# Patient Record
Sex: FEMALE | Race: OTHER | HISPANIC OR LATINO | URBAN - METROPOLITAN AREA
[De-identification: names, ages, dates, MRNs, and addresses within clinical notes are randomized per-mention and may not be internally consistent; named-entity substitution may affect disease eponyms.]

---

## 2024-04-16 ENCOUNTER — EMERGENCY (EMERGENCY)
Facility: HOSPITAL | Age: 44
LOS: 1 days | Discharge: HOME | End: 2024-04-16
Attending: STUDENT IN AN ORGANIZED HEALTH CARE EDUCATION/TRAINING PROGRAM
Payer: MEDICAID

## 2024-04-16 VITALS
TEMPERATURE: 98 F | DIASTOLIC BLOOD PRESSURE: 87 MMHG | SYSTOLIC BLOOD PRESSURE: 145 MMHG | RESPIRATION RATE: 20 BRPM | HEART RATE: 78 BPM | OXYGEN SATURATION: 96 %

## 2024-04-16 VITALS
HEART RATE: 86 BPM | RESPIRATION RATE: 16 BRPM | TEMPERATURE: 98 F | WEIGHT: 173.5 LBS | SYSTOLIC BLOOD PRESSURE: 127 MMHG | DIASTOLIC BLOOD PRESSURE: 85 MMHG | OXYGEN SATURATION: 94 %

## 2024-04-16 LAB
BASOPHILS # BLD AUTO: 0.05 K/UL — SIGNIFICANT CHANGE UP (ref 0–0.2)
BASOPHILS NFR BLD AUTO: 0.6 % — SIGNIFICANT CHANGE UP (ref 0–2)
EOSINOPHIL # BLD AUTO: 0.08 K/UL — SIGNIFICANT CHANGE UP (ref 0–0.5)
EOSINOPHIL NFR BLD AUTO: 0.9 % — SIGNIFICANT CHANGE UP (ref 0–6)
HCT VFR BLD CALC: 36.5 % — SIGNIFICANT CHANGE UP (ref 34.5–45)
HGB BLD-MCNC: 11.8 G/DL — SIGNIFICANT CHANGE UP (ref 11.5–15.5)
IMM GRANULOCYTES NFR BLD AUTO: 0.2 % — SIGNIFICANT CHANGE UP (ref 0–0.9)
LYMPHOCYTES # BLD AUTO: 3.34 K/UL — HIGH (ref 1–3.3)
LYMPHOCYTES # BLD AUTO: 37.4 % — SIGNIFICANT CHANGE UP (ref 13–44)
MCHC RBC-ENTMCNC: 25.8 PG — LOW (ref 27–34)
MCHC RBC-ENTMCNC: 32.3 GM/DL — SIGNIFICANT CHANGE UP (ref 32–36)
MCV RBC AUTO: 79.7 FL — LOW (ref 80–100)
MONOCYTES # BLD AUTO: 0.95 K/UL — HIGH (ref 0–0.9)
MONOCYTES NFR BLD AUTO: 10.7 % — SIGNIFICANT CHANGE UP (ref 2–14)
NEUTROPHILS # BLD AUTO: 4.48 K/UL — SIGNIFICANT CHANGE UP (ref 1.8–7.4)
NEUTROPHILS NFR BLD AUTO: 50.2 % — SIGNIFICANT CHANGE UP (ref 43–77)
PLATELET # BLD AUTO: 396 K/UL — SIGNIFICANT CHANGE UP (ref 150–400)
RBC # BLD: 4.58 M/UL — SIGNIFICANT CHANGE UP (ref 3.8–5.2)
RBC # FLD: 14.4 % — SIGNIFICANT CHANGE UP (ref 10.3–14.5)
WBC # BLD: 8.92 K/UL — SIGNIFICANT CHANGE UP (ref 3.8–10.5)
WBC # FLD AUTO: 8.92 K/UL — SIGNIFICANT CHANGE UP (ref 3.8–10.5)

## 2024-04-16 PROCEDURE — 99284 EMERGENCY DEPT VISIT MOD MDM: CPT

## 2024-04-16 PROCEDURE — 93010 ELECTROCARDIOGRAM REPORT: CPT

## 2024-04-16 RX ORDER — MECLIZINE HCL 12.5 MG
50 TABLET ORAL ONCE
Refills: 0 | Status: COMPLETED | OUTPATIENT
Start: 2024-04-16 | End: 2024-04-16

## 2024-04-16 RX ORDER — SODIUM CHLORIDE 9 MG/ML
1000 INJECTION INTRAMUSCULAR; INTRAVENOUS; SUBCUTANEOUS ONCE
Refills: 0 | Status: COMPLETED | OUTPATIENT
Start: 2024-04-16 | End: 2024-04-16

## 2024-04-16 RX ORDER — ONDANSETRON 8 MG/1
4 TABLET, FILM COATED ORAL ONCE
Refills: 0 | Status: COMPLETED | OUTPATIENT
Start: 2024-04-16 | End: 2024-04-16

## 2024-04-16 RX ADMIN — ONDANSETRON 4 MILLIGRAM(S): 8 TABLET, FILM COATED ORAL at 23:42

## 2024-04-16 RX ADMIN — Medication 50 MILLIGRAM(S): at 23:42

## 2024-04-16 RX ADMIN — SODIUM CHLORIDE 1000 MILLILITER(S): 9 INJECTION INTRAMUSCULAR; INTRAVENOUS; SUBCUTANEOUS at 23:44

## 2024-04-16 NOTE — ED ADULT TRIAGE NOTE - CHIEF COMPLAINT QUOTE
pt c/o dizziness and vomiting onset today, states feeling warm but hasn't taken temp.  States her head is spinning.  Took Tylenol at 3pm

## 2024-04-17 LAB
ANION GAP SERPL CALC-SCNC: 11 MMOL/L — SIGNIFICANT CHANGE UP (ref 5–17)
BUN SERPL-MCNC: 11.7 MG/DL — SIGNIFICANT CHANGE UP (ref 8–20)
CALCIUM SERPL-MCNC: 9.2 MG/DL — SIGNIFICANT CHANGE UP (ref 8.4–10.5)
CHLORIDE SERPL-SCNC: 100 MMOL/L — SIGNIFICANT CHANGE UP (ref 96–108)
CO2 SERPL-SCNC: 26 MMOL/L — SIGNIFICANT CHANGE UP (ref 22–29)
CREAT SERPL-MCNC: 0.58 MG/DL — SIGNIFICANT CHANGE UP (ref 0.5–1.3)
EGFR: 114 ML/MIN/1.73M2 — SIGNIFICANT CHANGE UP
GLUCOSE SERPL-MCNC: 103 MG/DL — HIGH (ref 70–99)
POTASSIUM SERPL-MCNC: 3.7 MMOL/L — SIGNIFICANT CHANGE UP (ref 3.5–5.3)
POTASSIUM SERPL-SCNC: 3.7 MMOL/L — SIGNIFICANT CHANGE UP (ref 3.5–5.3)
SODIUM SERPL-SCNC: 137 MMOL/L — SIGNIFICANT CHANGE UP (ref 135–145)

## 2024-04-17 PROCEDURE — 80048 BASIC METABOLIC PNL TOTAL CA: CPT

## 2024-04-17 PROCEDURE — 85025 COMPLETE CBC W/AUTO DIFF WBC: CPT

## 2024-04-17 PROCEDURE — 96374 THER/PROPH/DIAG INJ IV PUSH: CPT

## 2024-04-17 PROCEDURE — 93005 ELECTROCARDIOGRAM TRACING: CPT

## 2024-04-17 PROCEDURE — T1013: CPT

## 2024-04-17 PROCEDURE — 99284 EMERGENCY DEPT VISIT MOD MDM: CPT | Mod: 25

## 2024-04-17 PROCEDURE — 36415 COLL VENOUS BLD VENIPUNCTURE: CPT

## 2024-04-17 RX ORDER — MECLIZINE HCL 12.5 MG
1 TABLET ORAL
Qty: 30 | Refills: 0
Start: 2024-04-17 | End: 2024-04-26

## 2024-04-17 NOTE — ED PROVIDER NOTE - PATIENT PORTAL LINK FT
You can access the FollowMyHealth Patient Portal offered by [facility] by registering at the following website: [email or web address]. By joining [employer]’s FollowMyHealth portal, you will also be able to view your health information using other applications (apps) compatible with our system.

## 2024-04-17 NOTE — ED PROVIDER NOTE - NSFOLLOWUPINSTRUCTIONS_ED_ALL_ED_FT
Vértigo      El vértigo es la sensación de que usted o todo lo que lo rodea se mueve cuando en realidad eso no sucede. El vértigo puede ser peligroso si ocurre mientras está haciendo algo que podría suponer un riesgo para usted y para los demás, por ejemplo, conduciendo un automóvil.    ¿Cuáles son las causas?  Esta afección es causada por bridger alteración en las señales que [place] sistema sensorial envía al cerebro. Existen diferentes factores que pueden causar esta alteración, por ejemplo:    Infecciones, especialmente en el oído interno.  Bridger reacción adversa a un medicamento o el uso indebido de alcohol y medicamentos.  Abstinencia de drogas o alcohol.  Cambios rápidos de posición, alexander al acostarse o darse vuelta en la cama.  Cefaleas migrañosas.  Bridger disminución del flujo sanguíneo hacia el cerebro.  Bridger disminución de la presión arterial.  Un aumento de la presión en el cerebro por un traumatismo en la nancy o el antonio, accidente cerebrovascular, infección, tumor o sangrado.  Trastornos del sistema nervioso central.    ¿Cuáles son los signos o los síntomas?  Generalmente, los síntomas de iraida trastorno se presentan al  la nancy o los ojos en diferentes direcciones. Los síntomas pueden aparecer repentinamente y suelen durar menos de un minuto. Entre los síntomas se pueden incluir los siguientes:    Pérdida del equilibrio y caídas.  Sensación de estar dando vueltas o moviéndose.  Sensación de que el entorno está dando vueltas o moviéndose.  Náuseas y vómitos.  Visión doble o borrosa.  Dificultad para escuchar.  Hablar arrastrando las palabras.  Mareos.  Movimientos oculares involuntarios (nistagmo).    Los síntomas pueden ser leves y un poco molestos, o pueden ser graves e interferir con la paty cotidiana. Los episodios de vértigo pueden repetirse (ser recurrentes) a lo peyton del tiempo y algunos movimientos pueden desencadenarlos. Los síntomas pueden mejorar con el tiempo.    ¿Cómo se diagnostica?  Esta afección puede diagnosticarse en función de la historia clínica y la calidad del nistagmo. [place] médico podrá examinar el movimiento de ira ojos indicándole que los cambie de dirección rápidamente para provocar el nistagmo. [place] se llama también prueba de [place], prueba de impulso cefálico o prueba de rotación. Adeel vez lo deriven a un médico especialista en oído, nariz y garganta (otorrinolaringólogo) o a kelly que se especializa en trastornos del sistema nervioso central (neurólogo).    Pueden hacerle otros estudios, entre ellos:    Un examen físico.  Análisis de raul.  Resonancia magnética (RM).  Bridger exploración por tomografía computarizada (TC).  Un electrocardiograma (ECG). Iraida estudio registra la actividad eléctrica del corazón.  Un electroencefalograma (EEG). Iraida estudio registra la actividad eléctrica del cerebro.  Pruebas de audición.    ¿Cómo se trata?  El tratamiento de esta afección depende de la causa y la gravedad de los síntomas. Las opciones de tratamiento incluyen:    Medicamentos para tratar las náuseas o el vértigo. Se utilizan generalmente para los casos graves. Algunos medicamentos que se utilizan para tratar otras afecciones también podrían reducir o eliminar los síntomas del vértigo. Estos incluyen los siguientes:  Medicamentos para controlar las alergias (antihistamínicos).  Medicamentos para controlar las convulsiones (anticonvulsivos).  Medicamentos para aliviar la depresión (antidepresivos).  Medicamentos para aliviar la ansiedad (sedantes).  Movimientos de nancy para acomodar el oído interno otra vez a la normalidad. Si el vértigo es causado por un problema en el oído, [place] médico podría recomendarle que nora ciertos movimientos para corregir el problema.  Cirugía. [place] es raro.    Siga estas indicaciones en [place] casa:  Seguridad    Muévase despacio.No nora movimientos bruscos con el cuerpo o con la nancy.  No conduzca.  No opere maquinaria pesada.  No nora ninguna tarea que podría ser peligrosa para usted o para otras personas en pan de que ocurriera un episodio de vértigo.  Si tiene dificultad para caminar o mantener el equilibrio, use un bastón para mantener la estabilidad. Si se siente mareado o inestable, siéntese de inmediato.  Retome ira actividades normales alexander se lo haya indicado el médico. Pregúntele al médico qué actividades son seguras para usted.    Instrucciones generales    [place] los medicamentos de venta radha y los recetados solamente alexander se lo haya indicado el médico.  Evite algunas posiciones o determinados movimientos alexander se lo haya indicado el médico.  [place]aina suficiente líquido para mantener la orina fatuma o de color amarillo pálido.  Concurra a todas las visitas de seguimiento alexander se lo haya indicado el médico. [place] es importante.    Comuníquese con un médico si:  Los medicamentos no le alivian el vértigo o iraida empeora.  Tiene fiebre.  [place] afección empeora o presenta síntomas nuevos.  Ira familiares o amigos advierten cambios en [place] comportamiento.  Las náuseas o los vómitos empeoran.  Tiene sensación de adormecimiento o de “hormigueo” en bridger parte del cuerpo.    Solicite ayuda de inmediato si:  Tiene dificultad para hablar o para moverse.  Esta mareado todo el tiempo.  Se desmaya.  Tiene josh de nancy intensos.  Tiene debilidad en las abimbola, los brazos o las piernas.  Presenta cambios en la audición o la visión.  Siente rigidez en el antonio.  Tiene sensibilidad a la del.    NOTAS ADICIONALES E INSTRUCCIONES    [place] meclizine 25mg cada 6 horas para el mareo.  Por favor tenga seguimiento con [place] doctor primario entre 2 foss.  Regrese a urgencias por cualquier preocupacion medica.

## 2024-04-17 NOTE — ED PROVIDER NOTE - CARE PROVIDER_API CALL
Atul Thurman  Otolaryngology  [address] [place] [address], [address]  [place] [address]  Phone: [phone]  Fax: [phone]  Follow Up Time: 4-6 Days

## 2024-04-17 NOTE — ED PROVIDER NOTE - CLINICAL SUMMARY MEDICAL DECISION MAKING FREE TEXT BOX
43 yof no sig pmh here with dizziness and nausea. worse with moving. Denies fever, headache, cp, sob, abd pain, numbness, tingling weakness. Denies recent illness. Denies cardiac history.   Ap - nonfocal neuro exam. likely peripheral vertigo. treat supportively. reassess    Progress: feeling better, stable for dc

## 2024-04-17 NOTE — ED PROVIDER NOTE - OBJECTIVE STATEMENT
43 yof no sig pmh here with dizziness and nausea. worse with moving. Denies fever, headache, cp, sob, abd pain, numbness, tingling weakness. Denies recent illness. Denies cardiac history.